# Patient Record
Sex: FEMALE | HISPANIC OR LATINO | URBAN - NONMETROPOLITAN AREA
[De-identification: names, ages, dates, MRNs, and addresses within clinical notes are randomized per-mention and may not be internally consistent; named-entity substitution may affect disease eponyms.]

---

## 2018-04-13 NOTE — PATIENT DISCUSSION
New Prescription: Besivance (besifloxacin): drops,suspension: 0.6% 1 drop four times a day into right eye 04-

## 2018-04-13 NOTE — PATIENT DISCUSSION
CORNEAL ABRASION: OD, PRESCRIBE:OCUFLOX/BESIVANCE QID OD AND EMYCIN DEREK QHS. RETURN FOR FOLLOW-UP AS SCHEDULED.

## 2018-04-13 NOTE — PATIENT DISCUSSION
New Prescription: erythromycin (erythromycin): ointment: 5 mg/gram (0.5 %) a small amount at bedtime as needed into right eye 04-

## 2018-04-16 NOTE — PATIENT DISCUSSION
Continue: erythromycin (erythromycin): ointment: 5 mg/gram (0.5 %) a small amount at bedtime as needed into right eye 04-

## 2018-04-16 NOTE — PATIENT DISCUSSION
CORNEAL ABRASION: OD, HEALED. CONTINUE BESI TID, EMYCIN DEREK QHS THROUGH FRIDAY THEN DISCONTINUE. RETURN FOR FOLLOW-UP AS SCHEDULED.

## 2021-03-29 ENCOUNTER — IMPORTED ENCOUNTER (OUTPATIENT)
Dept: URBAN - NONMETROPOLITAN AREA CLINIC 1 | Facility: CLINIC | Age: 41
End: 2021-03-29

## 2021-03-29 PROBLEM — H52.4: Noted: 2021-03-29

## 2021-03-29 PROCEDURE — S0620 ROUTINE OPHTHALMOLOGICAL EXA: HCPCS

## 2022-04-15 ASSESSMENT — VISUAL ACUITY
OD_GLARE: 20/30
OS_GLARE: 20/40
OS_CC: 20/20
OD_CC: 20/20
OS_SC: 20/25
OD_SC: 20/25

## 2022-04-15 ASSESSMENT — TONOMETRY
OS_IOP_MMHG: 12
OD_IOP_MMHG: 12

## 2024-05-07 NOTE — PATIENT DISCUSSION
Corneal Abrasion Counseling: I have explained to the patient that most minor corneal abrasions fully recover  without permanent eye damage. I have further explained that deeper scratches can cause corneal infections, erosion, chronic loose epithelium  or scarring if not treated properly. These complications can result in long-term vision problems. I have explained that proper healing is dependent on patient compliance of the treatment prescribed and to follow up as scheduled. It was explained that a bandage contact lens may increase the possibility of an infection and the alternative treatments including patching or lubrication alone were provided to the patient. The patient has elected to proceed with the bandage contact lens for comfort and convenience. The patient was instructed to keep water and any material including makeup if applicable out of the affected eye. The patient has been instructed to report any unusual symptoms during or following healing to include a recurrence of pain. Pt provided a urinal in order to collect a sample. Pt will call when he is able to void.